# Patient Record
Sex: FEMALE | Race: WHITE | NOT HISPANIC OR LATINO | Employment: FULL TIME | URBAN - METROPOLITAN AREA
[De-identification: names, ages, dates, MRNs, and addresses within clinical notes are randomized per-mention and may not be internally consistent; named-entity substitution may affect disease eponyms.]

---

## 2023-06-19 ENCOUNTER — APPOINTMENT (OUTPATIENT)
Dept: PRIMARY CARE | Facility: CLINIC | Age: 73
End: 2023-06-19
Payer: MEDICARE

## 2023-08-21 ENCOUNTER — APPOINTMENT (OUTPATIENT)
Dept: PRIMARY CARE | Facility: CLINIC | Age: 73
End: 2023-08-21
Payer: MEDICARE

## 2023-09-18 ENCOUNTER — APPOINTMENT (OUTPATIENT)
Dept: PRIMARY CARE | Facility: CLINIC | Age: 73
End: 2023-09-18
Payer: MEDICARE

## 2023-10-05 ENCOUNTER — TELEPHONE (OUTPATIENT)
Dept: CARDIOLOGY | Facility: HOSPITAL | Age: 73
End: 2023-10-05
Payer: MEDICARE

## 2023-10-05 NOTE — TELEPHONE ENCOUNTER
Patient reports that she is still COVID+ and not taking any BP medications.     Bps:  124/86,  128/84,   121/83     Instructed to continue monitoring BP. We will determine which medications to resume at her appointment next Thursday.

## 2023-10-11 PROBLEM — K21.9 ESOPHAGEAL REFLUX: Status: ACTIVE | Noted: 2023-10-11

## 2023-10-11 PROBLEM — I42.9 CARDIOMYOPATHY (MULTI): Status: ACTIVE | Noted: 2023-10-11

## 2023-10-11 PROBLEM — E55.9 VITAMIN D DEFICIENCY: Status: ACTIVE | Noted: 2023-10-11

## 2023-10-11 PROBLEM — F41.9 ANXIETY: Status: ACTIVE | Noted: 2023-10-11

## 2023-10-11 PROBLEM — Q23.1 BICUSPID AORTIC VALVE (HHS-HCC): Status: ACTIVE | Noted: 2023-10-11

## 2023-10-11 PROBLEM — H52.13 MYOPIA, BILATERAL: Status: ACTIVE | Noted: 2023-10-11

## 2023-10-11 PROBLEM — I72.9 PSEUDOANEURYSM (CMS-HCC): Status: ACTIVE | Noted: 2023-10-11

## 2023-10-11 PROBLEM — M85.80 OSTEOPENIA: Status: ACTIVE | Noted: 2023-10-11

## 2023-10-11 PROBLEM — Z95.2 S/P TAVR (TRANSCATHETER AORTIC VALVE REPLACEMENT): Status: ACTIVE | Noted: 2023-10-11

## 2023-10-11 PROBLEM — Z85.850 HISTORY OF THYROID CANCER: Status: ACTIVE | Noted: 2023-10-11

## 2023-10-11 PROBLEM — N60.02 CYST OF LEFT BREAST: Status: ACTIVE | Noted: 2023-10-11

## 2023-10-11 PROBLEM — I71.21 ANEURYSM OF ASCENDING AORTA (CMS-HCC): Status: ACTIVE | Noted: 2023-10-11

## 2023-10-11 PROBLEM — I35.9 AORTIC VALVE DISORDER: Status: ACTIVE | Noted: 2023-10-11

## 2023-10-11 PROBLEM — R92.30 DENSE BREAST TISSUE: Status: ACTIVE | Noted: 2023-10-11

## 2023-10-11 PROBLEM — H52.4 HYPEROPIA WITH PRESBYOPIA OF BOTH EYES: Status: ACTIVE | Noted: 2023-10-11

## 2023-10-11 PROBLEM — Z78.0 MENOPAUSE: Status: ACTIVE | Noted: 2023-10-11

## 2023-10-11 PROBLEM — E78.5 HYPERLIPIDEMIA: Status: ACTIVE | Noted: 2023-10-11

## 2023-10-11 PROBLEM — I10 HYPERTENSION: Status: ACTIVE | Noted: 2023-10-11

## 2023-10-11 PROBLEM — H52.03 HYPEROPIA WITH PRESBYOPIA OF BOTH EYES: Status: ACTIVE | Noted: 2023-10-11

## 2023-10-11 PROBLEM — A49.9 BACTERIAL UTI: Status: ACTIVE | Noted: 2023-10-11

## 2023-10-11 PROBLEM — E03.9 HYPOTHYROIDISM: Status: ACTIVE | Noted: 2023-10-11

## 2023-10-11 PROBLEM — N39.0 BACTERIAL UTI: Status: ACTIVE | Noted: 2023-10-11

## 2023-10-11 PROBLEM — R92.8 ABNORMAL SCREENING MAMMOGRAM: Status: ACTIVE | Noted: 2023-10-11

## 2023-10-11 PROBLEM — H25.813 COMBINED FORM OF AGE-RELATED CATARACT, BOTH EYES: Status: ACTIVE | Noted: 2023-10-11

## 2023-10-11 PROBLEM — T14.8XXA: Status: ACTIVE | Noted: 2023-10-11

## 2023-10-11 PROBLEM — Q23.81 BICUSPID AORTIC VALVE: Status: ACTIVE | Noted: 2023-10-11

## 2023-10-11 RX ORDER — NAPROXEN SODIUM 220 MG/1
81 TABLET, FILM COATED ORAL
COMMUNITY
Start: 2021-05-12 | End: 2023-10-12 | Stop reason: ALTCHOICE

## 2023-10-11 RX ORDER — ACETAMINOPHEN 500 MG
50 TABLET ORAL DAILY
COMMUNITY

## 2023-10-11 RX ORDER — ALPRAZOLAM 0.25 MG/1
0.25 TABLET ORAL NIGHTLY PRN
COMMUNITY

## 2023-10-11 RX ORDER — LOSARTAN POTASSIUM 50 MG/1
1 TABLET ORAL 2 TIMES DAILY
COMMUNITY
Start: 2018-07-27

## 2023-10-11 RX ORDER — LEVOTHYROXINE SODIUM 88 UG/1
1 TABLET ORAL DAILY
COMMUNITY
Start: 2018-03-29

## 2023-10-11 RX ORDER — BIOTIN 5 MG
1 TABLET ORAL DAILY
COMMUNITY

## 2023-10-11 RX ORDER — AMLODIPINE BESYLATE 2.5 MG/1
1 TABLET ORAL DAILY
COMMUNITY
Start: 2021-05-12

## 2023-10-11 RX ORDER — ALENDRONATE SODIUM 70 MG/1
70 TABLET ORAL
COMMUNITY

## 2023-10-11 RX ORDER — LIOTHYRONINE SODIUM 5 UG/1
1 TABLET ORAL DAILY
COMMUNITY

## 2023-10-11 RX ORDER — VALSARTAN 160 MG/1
160 TABLET ORAL 2 TIMES DAILY
COMMUNITY
End: 2023-10-12 | Stop reason: ALTCHOICE

## 2023-10-11 RX ORDER — MULTIVITAMIN
1 TABLET ORAL DAILY
COMMUNITY
End: 2023-10-12 | Stop reason: ALTCHOICE

## 2023-10-11 RX ORDER — ATORVASTATIN CALCIUM 40 MG/1
40 TABLET, FILM COATED ORAL DAILY
COMMUNITY
Start: 2018-05-21

## 2023-10-11 RX ORDER — DIPHENHYDRAMINE HCL 25 MG
25 CAPSULE ORAL NIGHTLY
COMMUNITY

## 2023-10-11 RX ORDER — CARVEDILOL 25 MG/1
25 TABLET ORAL
COMMUNITY
Start: 2018-11-02

## 2023-10-12 ENCOUNTER — OFFICE VISIT (OUTPATIENT)
Dept: CARDIOLOGY | Facility: HOSPITAL | Age: 73
End: 2023-10-12
Payer: MEDICARE

## 2023-10-12 VITALS
BODY MASS INDEX: 19.61 KG/M2 | WEIGHT: 122 LBS | HEIGHT: 66 IN | DIASTOLIC BLOOD PRESSURE: 70 MMHG | SYSTOLIC BLOOD PRESSURE: 164 MMHG | HEART RATE: 96 BPM | OXYGEN SATURATION: 100 %

## 2023-10-12 DIAGNOSIS — I71.21 ASCENDING AORTIC ANEURYSM, UNSPECIFIED WHETHER RUPTURED (CMS-HCC): ICD-10-CM

## 2023-10-12 DIAGNOSIS — R01.1 MURMUR, HEART: Primary | ICD-10-CM

## 2023-10-12 DIAGNOSIS — E78.5 HYPERLIPIDEMIA, UNSPECIFIED HYPERLIPIDEMIA TYPE: ICD-10-CM

## 2023-10-12 DIAGNOSIS — Z95.2 S/P TAVR (TRANSCATHETER AORTIC VALVE REPLACEMENT): ICD-10-CM

## 2023-10-12 DIAGNOSIS — I10 HYPERTENSION, UNSPECIFIED TYPE: ICD-10-CM

## 2023-10-12 PROCEDURE — 1036F TOBACCO NON-USER: CPT | Performed by: NURSE PRACTITIONER

## 2023-10-12 PROCEDURE — 3077F SYST BP >= 140 MM HG: CPT | Performed by: NURSE PRACTITIONER

## 2023-10-12 PROCEDURE — 99214 OFFICE O/P EST MOD 30 MIN: CPT | Performed by: NURSE PRACTITIONER

## 2023-10-12 PROCEDURE — 3078F DIAST BP <80 MM HG: CPT | Performed by: NURSE PRACTITIONER

## 2023-10-12 PROCEDURE — 93005 ELECTROCARDIOGRAM TRACING: CPT | Performed by: NURSE PRACTITIONER

## 2023-10-12 PROCEDURE — 93010 ELECTROCARDIOGRAM REPORT: CPT | Performed by: INTERNAL MEDICINE

## 2023-10-12 RX ORDER — NAPROXEN SODIUM 220 MG/1
81 TABLET, FILM COATED ORAL DAILY
Qty: 30 TABLET | Refills: 11
Start: 2023-10-12 | End: 2024-10-11

## 2023-10-12 ASSESSMENT — PATIENT HEALTH QUESTIONNAIRE - PHQ9
SUM OF ALL RESPONSES TO PHQ9 QUESTIONS 1 AND 2: 0
2. FEELING DOWN, DEPRESSED OR HOPELESS: NOT AT ALL
1. LITTLE INTEREST OR PLEASURE IN DOING THINGS: NOT AT ALL

## 2023-10-12 ASSESSMENT — ENCOUNTER SYMPTOMS
OCCASIONAL FEELINGS OF UNSTEADINESS: 0
LOSS OF SENSATION IN FEET: 0
DEPRESSION: 0

## 2023-10-12 NOTE — PROGRESS NOTES
Primary Care Physician: Mt Zarate MD  Date of Visit: 10/12/2023 11:40 AM EDT  Location of visit: [facility]     Chief Complaint:   Chief Complaint   Patient presents with    aneurysm of ascending aorta    Cardiomyopathy    Hyperlipidemia    Hypertension        HPI / Summary:   Mj Gandhi is a 73 y.o. female presents for followup. Seen in collaboration with Dr. Reed. Patient was tested positive for covid-19 on 9/26. She started Paxlovid same day. On 9/27 she was sitting at the kitchen table having a cup a coffee. She told her  she felt very weak. She started falling over onto the floor. Dropped coffee cup onto the floor.  caught her before the fall. He reports very brief loss of consciousness 1-2 seconds. No loss of bowel or bladder. She was not eating or drinking well. Reports fever and diarrhea. She called our office after episode and was told to stop blood pressure medications. She has been monitoring her blood pressure for which she reports have been 120s over 60s. She is overall feeling better now. She has not had any orthostatic lightheadedness or syncope. She has been walking regularly without chest pain or dyspnea. Denies chest pain, dyspnea, orthopnea, pnd, lightheadedness, dizziness, palpitations, lower extremity edema, or bleeding issues.                Past Medical History:  Past Medical History:   Diagnosis Date    Asymptomatic menopausal state 06/17/2016    History of menopause        Past Surgical History:  Past Surgical History:   Procedure Laterality Date    AORTIC VALVE REPLACEMENT  05/19/2022    Aortic Valve Replacement    TOTAL THYROIDECTOMY  07/12/2018    Thyroid Surgery Total Thyroidectomy          Social History:   reports that she has never smoked. She has never used smokeless tobacco. She reports that she does not drink alcohol and does not use drugs.     Family History:  family history includes Cancer in an other family member; Diabetes in an other family  "member; Hypertension in an other family member; Hypothyroidism in her daughter.      Allergies:  Allergies   Allergen Reactions    Bee Pollen Unknown    Dog Dander Unknown    House Dust Unknown       Outpatient Medications:  Current Outpatient Medications   Medication Instructions    alendronate (FOSAMAX) 70 mg, oral, Every 7 days, Take in the morning with a full glass of water, on an empty stomach, and do not take anything else by mouth or lie down for the next 30 min.<BR>TAKE WITH 8-12 OUNCES OF WATER.     ALPRAZolam (XANAX) 0.25 mg, oral, Nightly PRN    amLODIPine (Norvasc) 2.5 mg tablet 1 tablet, oral, Daily    atorvastatin (LIPITOR) 40 mg, oral, Daily, Taking 0.5 tablet qd    biotin 5 mg tablet 1 tablet, oral, Daily    calcium carb,gluc/mag ox,gluc (CALCIUM MAGNESIUM ORAL) 1 tablet, oral, Daily    carvedilol (COREG) 25 mg, oral, 2 times daily with meals    cholecalciferol (VITAMIN D-3) 50 mcg, oral, Daily    diphenhydrAMINE (BENADRYL) 25 mg, oral, Nightly    GLUTATHIONE ORAL 1 tablet, oral, Daily    liothyronine (Cytomel) 5 mcg tablet 1 tablet, oral, Daily    losartan (Cozaar) 50 mg tablet 1 tablet, oral, 2 times daily    multivitamin with iron (HAIR VITAMINS ORAL)  HAIR VITAMIN/ZINC/COPPER ONE TABLET DAILY<BR>    NON FORMULARY  K2MK7 daily SUPPLIMENT used to DIRECT CALCIUM INTO BONE<BR>    Synthroid 88 mcg tablet 1 tablet, oral, Daily    ubidecarenone (COENZYME Q10, BULK, MISC) oral       Physical Exam:  Vitals:    10/12/23 1159   BP: (!) 200/85   BP Location: Left arm   Patient Position: Sitting   Pulse: 96   SpO2: 100%   Weight: 55.3 kg (122 lb)   Height: 1.676 m (5' 6\")     Wt Readings from Last 5 Encounters:   10/12/23 55.3 kg (122 lb)   09/30/22 55.7 kg (122 lb 12.8 oz)   05/19/22 55.8 kg (123 lb 0.6 oz)   10/29/21 54 kg (119 lb)   10/22/21 55.7 kg (122 lb 12.8 oz)     Body mass index is 19.69 kg/m².     GENERAL: alert, cooperative, pleasant, in no acute distress  SKIN: warm and dry  NECK: Normal JVD, " "negative HJR  CARDIAC: Regular rate and rhythm with 3/6 late peaking systolic murmur heard at base radiating to apex, no rub or gallops  CHEST: Normal respiratory efforts, lungs clear to auscultation bilaterally.  ABDOMEN: soft, nontender, nondistended  EXTREMITIES: no edema, +2 palpable RP and PT pulses bilaterally       Last Labs:  Recent Labs     10/11/19  1345 03/19/21  1056 09/23/22  1059   WBC 6.3 3.8* 4.1*   HGB 13.2 13.6 13.5   HCT 40.8 41.4 40.0   * 126* 132*   MCV 97 98 97     Recent Labs     10/11/19  1345 03/19/21  1056 09/23/22  1059    138 143   K 4.0 4.1 3.8    102 105   BUN 18 20 19   CREATININE 0.83 0.81 0.82     CMP -  Lab Results   Component Value Date    CALCIUM 9.5 09/23/2022    PHOS 3.7 07/04/2018    PROT 7.5 09/23/2022    ALBUMIN 4.5 09/23/2022    AST 19 09/23/2022    ALT 19 09/23/2022    ALKPHOS 48 09/23/2022    BILITOT 0.6 09/23/2022       LIPID PANEL -   Lab Results   Component Value Date    CHOL 174 09/23/2022    HDL 87.3 09/23/2022    LDLF 76 09/23/2022    TRIG 55 09/23/2022       No results found for: \"BNP\", \"HGBA1C\"    Last Cardiology Tests:  ECG:  Obtained and reviewed EKG- normal sinus rhythm HR 96, left axis deviation, LVH, possible prior septal infarct    Echo:  Echo Results:  5/12/2021  CONCLUSIONS:   1. The left ventricular systolic function is normal with a 55-60% estimated ejection fraction.   2. Abnormal septal motion consistent with post-operative status.   3. Spectral Doppler shows an impaired relaxation pattern of left ventricular diastolic filling.   4. There is a transcatheter aortic valve replacement.   5. There is moderate dilatation of the ascending aorta.         Cath:  6/15/2018  CONCLUSIONS:   1. Normal coronary arteries.              Assessment/Plan   Diagnoses and all orders for this visit:  Murmur, heart  -     Transthoracic echo (TTE) complete; Future  -     perflutren lipid microspheres (Definity) injection 3 mL of dilution  -     sulfur " hexafluoride microsphr (Lumason) injection 24.28 mg  -     perflutren protein A microsphere (Optison) injection 0.5 mL  Hypertension, unspecified type  -     ECG 12 lead (Clinic Performed)  -     CBC; Future  -     Comprehensive metabolic panel; Future  -     TSH; Future  S/P TAVR (transcatheter aortic valve replacement)  -     CT chest wo IV contrast; Future  -     aspirin 81 mg chewable tablet; Chew 1 tablet (81 mg) once daily.  Ascending aortic aneurysm, unspecified whether ruptured (CMS/HCC)  -     CT chest wo IV contrast; Future  Hyperlipidemia, unspecified hyperlipidemia type  -     Lipid panel; Future  In summary Ms. Gandhi is a pleasant 73 year-old white female with a past medical history significant for bicuspid aortic valve status post bioprosthetic AVR in 2012 with subsequent valve in valve TAVR in 2018 for severe aortic regurgitation, ascending aortic aneurysm, nonischemic cardiomyopathy with recovery of LV function, hypertension, mild coronary atherosclerosis on angiography, and hyperlipidemia. She had syncopal episode while sitting down in the setting of covid, hypotension, and suspect dehydration. She will continue to hold blood pressure medications (Amlodipine, Carvedilol, and Losartan). Her blood pressures at home are 120s over 60s. Her blood pressure is elevated today. Her home blood pressure monitor was brought to the office and reading similiarly consistent with white coat syndrome. I have ordered an echocardiogram today as her aortic valve sounds severely narrowed. I have ordered routine blood work as above. I have ordered CT of chest to be done in February for surveillance of ascending aortic aneurysm. I have instructed her to restart Aspirin 81 mg daily. She will continue all other cardiovascular medications. She will follow up in 2 months.       Orders:  No orders of the defined types were placed in this encounter.     Followup Appts:  Future Appointments   Date Time Provider Department Center    10/27/2023 11:45 AM Radha Hinton MD [id number] [facility]   12/4/2023  1:00 PM Mt Zarate MD [id number] [facility]           ____________________________________________________________  ESME Feng  [facility]

## 2023-10-12 NOTE — PATIENT INSTRUCTIONS
Stay off blood pressure medications  Re-start Aspirin 81 mg daily  Echo next week  Blood work CBC, CMP, TSH, lipid panel  Stay hydrated  CT of chest after 2/1/23 for surveillance of your aneurysm  Follow up in 2 months

## 2023-10-17 ENCOUNTER — TELEPHONE (OUTPATIENT)
Dept: ENDOCRINOLOGY | Facility: CLINIC | Age: 73
End: 2023-10-17
Payer: MEDICARE

## 2023-10-17 DIAGNOSIS — E55.9 VITAMIN D DEFICIENCY: Primary | ICD-10-CM

## 2023-10-17 DIAGNOSIS — R53.83 OTHER FATIGUE: ICD-10-CM

## 2023-10-17 DIAGNOSIS — E03.9 HYPOTHYROIDISM, UNSPECIFIED TYPE: ICD-10-CM

## 2023-10-17 NOTE — TELEPHONE ENCOUNTER
Pt would like to have her vitamin b 12 level checked when she has her thyroid levels checked and is asking to have you place an order.

## 2023-10-19 ENCOUNTER — TELEPHONE (OUTPATIENT)
Dept: CARDIOLOGY | Facility: HOSPITAL | Age: 73
End: 2023-10-19
Payer: MEDICARE

## 2023-10-19 NOTE — TELEPHONE ENCOUNTER
----- Message from Swetha Dominguez RN sent at 10/18/2023 12:57 PM EDT -----  Regarding: Blood Pressure Readings  Patient saw Rosalva last week. She's concerned about her blood pressure readings and heart rate. She's wondering if she should start some meds?    It also says on her summary from last week to stop her multivitamin.    Today 10/18/23:    143/69, 83  143/71, 86    Yesterday 10/17/23:    135/70  124/67      This morning 120/70 blood pressure. Continue to monitor blood pressure. No changes.

## 2023-10-20 ENCOUNTER — APPOINTMENT (OUTPATIENT)
Dept: CARDIOLOGY | Facility: HOSPITAL | Age: 73
End: 2023-10-20
Payer: MEDICARE

## 2023-10-27 ENCOUNTER — APPOINTMENT (OUTPATIENT)
Dept: OBSTETRICS AND GYNECOLOGY | Facility: CLINIC | Age: 73
End: 2023-10-27
Payer: MEDICARE

## 2023-12-01 ENCOUNTER — HOSPITAL ENCOUNTER (OUTPATIENT)
Dept: CARDIOLOGY | Facility: HOSPITAL | Age: 73
Discharge: HOME | End: 2023-12-01
Payer: MEDICARE

## 2023-12-01 ENCOUNTER — LAB (OUTPATIENT)
Dept: LAB | Facility: LAB | Age: 73
End: 2023-12-01
Payer: MEDICARE

## 2023-12-01 DIAGNOSIS — E78.5 HYPERLIPIDEMIA, UNSPECIFIED HYPERLIPIDEMIA TYPE: ICD-10-CM

## 2023-12-01 DIAGNOSIS — I10 HYPERTENSION, UNSPECIFIED TYPE: ICD-10-CM

## 2023-12-01 DIAGNOSIS — E55.9 VITAMIN D DEFICIENCY: ICD-10-CM

## 2023-12-01 DIAGNOSIS — E03.9 HYPOTHYROIDISM, UNSPECIFIED TYPE: ICD-10-CM

## 2023-12-01 DIAGNOSIS — R01.1 MURMUR, HEART: ICD-10-CM

## 2023-12-01 DIAGNOSIS — R53.83 OTHER FATIGUE: ICD-10-CM

## 2023-12-01 LAB
25(OH)D3 SERPL-MCNC: 65 NG/ML (ref 30–100)
ALBUMIN SERPL BCP-MCNC: 4.1 G/DL (ref 3.4–5)
ALP SERPL-CCNC: 51 U/L (ref 33–136)
ALT SERPL W P-5'-P-CCNC: 21 U/L (ref 7–45)
ANION GAP SERPL CALC-SCNC: 13 MMOL/L (ref 10–20)
AORTIC VALVE MEAN GRADIENT: 16
AORTIC VALVE PEAK VELOCITY: 2.81
AST SERPL W P-5'-P-CCNC: 23 U/L (ref 9–39)
AV PEAK GRADIENT: 31.6
AVA (PEAK VEL): 1.55
AVA (VTI): 1.47
BILIRUB SERPL-MCNC: 0.6 MG/DL (ref 0–1.2)
BUN SERPL-MCNC: 25 MG/DL (ref 6–23)
CALCIUM SERPL-MCNC: 8.5 MG/DL (ref 8.6–10.3)
CHLORIDE SERPL-SCNC: 105 MMOL/L (ref 98–107)
CHOLEST SERPL-MCNC: 156 MG/DL (ref 0–199)
CHOLESTEROL/HDL RATIO: 1.9
CO2 SERPL-SCNC: 26 MMOL/L (ref 21–32)
CREAT SERPL-MCNC: 0.69 MG/DL (ref 0.5–1.05)
EJECTION FRACTION APICAL 4 CHAMBER: 66.3
EJECTION FRACTION: 65
ERYTHROCYTE [DISTWIDTH] IN BLOOD BY AUTOMATED COUNT: 13.8 % (ref 11.5–14.5)
GFR SERPL CREATININE-BSD FRML MDRD: >90 ML/MIN/1.73M*2
GLUCOSE SERPL-MCNC: 81 MG/DL (ref 74–99)
HCT VFR BLD AUTO: 38.8 % (ref 36–46)
HDLC SERPL-MCNC: 82.1 MG/DL
HGB BLD-MCNC: 12.5 G/DL (ref 12–16)
LDLC SERPL CALC-MCNC: 64 MG/DL
LEFT ATRIUM VOLUME AREA LENGTH INDEX BSA: 43
LEFT VENTRICLE INTERNAL DIMENSION DIASTOLE: 5.2 (ref 3.5–6)
LEFT VENTRICULAR OUTFLOW TRACT DIAMETER: 2.1
MCH RBC QN AUTO: 31.1 PG (ref 26–34)
MCHC RBC AUTO-ENTMCNC: 32.2 G/DL (ref 32–36)
MCV RBC AUTO: 97 FL (ref 80–100)
MITRAL VALVE E/A RATIO: 0.77
MITRAL VALVE E/E' RATIO: 18.63
NON HDL CHOLESTEROL: 74 MG/DL (ref 0–149)
NRBC BLD-RTO: 0 /100 WBCS (ref 0–0)
PLATELET # BLD AUTO: 118 X10*3/UL (ref 150–450)
POTASSIUM SERPL-SCNC: 3.7 MMOL/L (ref 3.5–5.3)
PROT SERPL-MCNC: 6.9 G/DL (ref 6.4–8.2)
RBC # BLD AUTO: 4.02 X10*6/UL (ref 4–5.2)
RIGHT VENTRICLE PEAK SYSTOLIC PRESSURE: 36.6
SODIUM SERPL-SCNC: 140 MMOL/L (ref 136–145)
T3FREE SERPL-MCNC: 2.9 PG/ML (ref 2.3–4.2)
T4 FREE SERPL-MCNC: 1.06 NG/DL (ref 0.61–1.12)
TRIGL SERPL-MCNC: 48 MG/DL (ref 0–149)
TSH SERPL-ACNC: 0.75 MIU/L (ref 0.44–3.98)
VIT B12 SERPL-MCNC: 681 PG/ML (ref 211–911)
VLDL: 10 MG/DL (ref 0–40)
WBC # BLD AUTO: 6.1 X10*3/UL (ref 4.4–11.3)

## 2023-12-01 PROCEDURE — 93306 TTE W/DOPPLER COMPLETE: CPT | Performed by: INTERNAL MEDICINE

## 2023-12-01 PROCEDURE — 84439 ASSAY OF FREE THYROXINE: CPT

## 2023-12-01 PROCEDURE — 82306 VITAMIN D 25 HYDROXY: CPT

## 2023-12-01 PROCEDURE — 36415 COLL VENOUS BLD VENIPUNCTURE: CPT

## 2023-12-01 PROCEDURE — 84481 FREE ASSAY (FT-3): CPT

## 2023-12-01 PROCEDURE — 80061 LIPID PANEL: CPT

## 2023-12-01 PROCEDURE — 82607 VITAMIN B-12: CPT

## 2023-12-01 PROCEDURE — 93306 TTE W/DOPPLER COMPLETE: CPT

## 2023-12-01 PROCEDURE — 85027 COMPLETE CBC AUTOMATED: CPT

## 2023-12-01 PROCEDURE — 80053 COMPREHEN METABOLIC PANEL: CPT

## 2023-12-01 PROCEDURE — 84443 ASSAY THYROID STIM HORMONE: CPT

## 2023-12-03 LAB
ATRIAL RATE: 96 BPM
P AXIS: 73 DEGREES
P OFFSET: 182 MS
P ONSET: 123 MS
PR INTERVAL: 164 MS
Q ONSET: 205 MS
QRS COUNT: 15 BEATS
QRS DURATION: 128 MS
QT INTERVAL: 394 MS
QTC CALCULATION(BAZETT): 497 MS
QTC FREDERICIA: 460 MS
R AXIS: -68 DEGREES
T AXIS: 96 DEGREES
T OFFSET: 402 MS
VENTRICULAR RATE: 96 BPM

## 2023-12-04 ENCOUNTER — APPOINTMENT (OUTPATIENT)
Dept: PRIMARY CARE | Facility: CLINIC | Age: 73
End: 2023-12-04
Payer: MEDICARE

## 2023-12-06 DIAGNOSIS — I35.1 SEVERE AORTIC VALVE REGURGITATION: Primary | ICD-10-CM

## 2023-12-07 ENCOUNTER — APPOINTMENT (OUTPATIENT)
Dept: CARDIOLOGY | Facility: HOSPITAL | Age: 73
End: 2023-12-07
Payer: MEDICARE

## 2023-12-08 ENCOUNTER — TELEPHONE (OUTPATIENT)
Dept: CARDIOLOGY | Facility: HOSPITAL | Age: 73
End: 2023-12-08
Payer: MEDICARE

## 2023-12-12 ENCOUNTER — TELEPHONE (OUTPATIENT)
Dept: CARDIOLOGY | Facility: HOSPITAL | Age: 73
End: 2023-12-12
Payer: MEDICARE

## 2023-12-18 ENCOUNTER — OFFICE VISIT (OUTPATIENT)
Dept: CARDIOLOGY | Facility: HOSPITAL | Age: 73
End: 2023-12-18
Payer: MEDICARE

## 2023-12-18 ENCOUNTER — APPOINTMENT (OUTPATIENT)
Dept: CARDIOLOGY | Facility: HOSPITAL | Age: 73
End: 2023-12-18
Payer: MEDICARE

## 2023-12-18 ENCOUNTER — OFFICE VISIT (OUTPATIENT)
Dept: CARDIAC SURGERY | Facility: HOSPITAL | Age: 73
End: 2023-12-18
Payer: MEDICARE

## 2023-12-18 VITALS
SYSTOLIC BLOOD PRESSURE: 174 MMHG | HEART RATE: 99 BPM | HEIGHT: 66 IN | BODY MASS INDEX: 19.44 KG/M2 | WEIGHT: 121 LBS | DIASTOLIC BLOOD PRESSURE: 80 MMHG | OXYGEN SATURATION: 96 %

## 2023-12-18 DIAGNOSIS — I35.1 SEVERE AORTIC VALVE REGURGITATION: ICD-10-CM

## 2023-12-18 DIAGNOSIS — I35.1 NONRHEUMATIC AORTIC VALVE INSUFFICIENCY: ICD-10-CM

## 2023-12-18 DIAGNOSIS — Z95.2 S/P TAVR (TRANSCATHETER AORTIC VALVE REPLACEMENT): ICD-10-CM

## 2023-12-18 PROCEDURE — 3079F DIAST BP 80-89 MM HG: CPT | Performed by: INTERNAL MEDICINE

## 2023-12-18 PROCEDURE — 99205 OFFICE O/P NEW HI 60 MIN: CPT | Performed by: THORACIC SURGERY (CARDIOTHORACIC VASCULAR SURGERY)

## 2023-12-18 PROCEDURE — 99214 OFFICE O/P EST MOD 30 MIN: CPT | Performed by: INTERNAL MEDICINE

## 2023-12-18 PROCEDURE — 1036F TOBACCO NON-USER: CPT | Performed by: INTERNAL MEDICINE

## 2023-12-18 PROCEDURE — 3077F SYST BP >= 140 MM HG: CPT | Performed by: INTERNAL MEDICINE

## 2023-12-18 PROCEDURE — 99215 OFFICE O/P EST HI 40 MIN: CPT | Performed by: THORACIC SURGERY (CARDIOTHORACIC VASCULAR SURGERY)

## 2023-12-18 PROCEDURE — 1036F TOBACCO NON-USER: CPT | Performed by: THORACIC SURGERY (CARDIOTHORACIC VASCULAR SURGERY)

## 2023-12-18 PROCEDURE — 1159F MED LIST DOCD IN RCRD: CPT | Performed by: THORACIC SURGERY (CARDIOTHORACIC VASCULAR SURGERY)

## 2023-12-18 PROCEDURE — 1159F MED LIST DOCD IN RCRD: CPT | Performed by: INTERNAL MEDICINE

## 2023-12-18 PROCEDURE — 1125F AMNT PAIN NOTED PAIN PRSNT: CPT | Performed by: THORACIC SURGERY (CARDIOTHORACIC VASCULAR SURGERY)

## 2023-12-18 PROCEDURE — 1125F AMNT PAIN NOTED PAIN PRSNT: CPT | Performed by: INTERNAL MEDICINE

## 2023-12-18 RX ORDER — OMEGA-3-ACID ETHYL ESTERS 1 G/1
1 CAPSULE, LIQUID FILLED ORAL 2 TIMES DAILY
COMMUNITY

## 2023-12-18 ASSESSMENT — PAIN SCALES - GENERAL: PAINLEVEL: 3

## 2023-12-18 NOTE — PROGRESS NOTES
Cardio: Derek       HPI: 73 year-old white female with a past medical history significant for bicuspid aortic valve status post bioprosthetic AVR in 2012 with subsequent valve in valve 34 Eolut Fx TAVR in 2018 for severe aortic regurgitation, ascending aortic aneurysm, nonischemic cardiomyopathy with recovery of LV function, hypertension, mild coronary atherosclerosis on angiography, and hyperlipidemia who is referred to us for Aortic regurgitation.   She had syncopal episode while sitting down in the setting of covid, denies any falls since then. Her blood pressures at home have been 120s over 60s.     On my interview today patient denies symptoms states she can do 20 minutes on the treadmill.  She does her shopping and grocery with no issues.  Also does weights sometimes in the gym with no issues.        ROS:    Constitutional: fatigue  Eyes: no eyesight problems, no blurred vision, no diplopia, no eye pain, no purulent discharge from the eyes, eyes not red, no dryness of the eyes and no itching of the eyes.   ENT: no nosebleeds, no hearing loss, no tinnitus, no earache, no sore throat, no hoarseness, no swollen glands in the neck and no nasal discharge.   Cardiovascular: no dyspnea on exertion, no chest pain  Respiratory: no chronic cough, not coughing up sputum, no wheezing that is consistent with asthma  Gastrointestinal: no change in bowel habits, no blood in stools, no diarrhea, no constipation, no nausea, no vomiting, no abdominal pain, no signs and symptoms of ulcer disease, no lexi colored stools and no intolerance to fatty foods.   Genitourinary: no hematuria,  no urinary frequency, no dysuria, no incontinence, no burning sensation during urination, no urinary hesitancy, no nocturia, no genital lesion, no testicular pain, urinary stream is not smaller and urinary stream does not start and stop.   Musculoskeletal: no arthralgias, no myalgias, no joint swelling, no joint stiffness, no muscle weakness, no  back pain, no limb pain, no limb swelling and no difficulty walking.   Skin: no skin rashes, no change in skin color and pigmentation, no skin lesions and no skin lumps.   Neurological: no seizures, no frequent falls, no headaches, no dizziness, no tingling, no fainting and no limb weakness.   Psychiatric: no depression, not suicidal, no confusion, no memory lapses or loss, no anxiety, no personality change and no emotional problems.   Endocrine: no goiter, no thyroid disorder, no diabetes mellitus, no excessive thirst, no dry skin, no cold intolerance, no heat intolerance, no erectile dysfunction, no increased urinary frequency, no proptosis and no deepening of the voice.   Hematologic/Lymphatic: no bleeding issues.   All other systems have been reviewed and are negative for complaint.       TAVR Workup:   - NYHA: I-II  - Frailty: 0/5  - EKG: Normal sinus rhythm,  ms  - TTE: 12/1/2023-EF 55%, severe aortic valve regurgitation mean gradient across aortic valve of 16 mmHg, mildly dilated Aortic root to 4.43  - CT TAVR: None  - LHC: Pending  - dental clearance: Goes regularly to dentist, no issues    - STS  Procedure Type: Isolated AVR  Perioperative Outcome Estimate %  Operative Mortality 1.6%  Morbidity & Mortality 8.11%  Stroke 1.9%  Renal Failure 0.469%  Reoperation 6.95%  Prolonged Ventilation 3.07%  Deep Sternal Wound Infection 0.048%  Long Hospital Stay (>14 days) 3.29%  Short Hospital Stay (<6 days)* 46.9%    Physical Exam:  Constitutional: alert and in no acute distress.   Eyes: no erythema, swelling or discharge from the eye .   ENT: no erythema, edema, exudate or lesions .   Neck: neck is supple, no JVD  Pulmonary: no increased work of breathing or signs of respiratory distress , lungs clear to auscultation. , normal percussion of chest  and chest palpation normal .   Cardiovascular: RRR, 3/6 diastolic murmur  no pitting edema  Abdomen: abdomen non-tender, no masses  and no hepatomegaly .  "  Neurologic: non-focal neurologic examination.           10/2/2020     2:38 PM 5/12/2021    11:18 AM 10/22/2021    12:00 PM 10/29/2021    12:40 PM 5/19/2022    10:31 AM 9/30/2022    12:01 PM 10/12/2023    11:59 AM   Vitals   Systolic 130 174 164 140 164 152 164   Diastolic 80 98 98 90 90 90 70   Heart Rate  62 86  69 80 96   Temp   36.4 °C (97.6 °F)   36.1 °C (97 °F)    Resp   16   16    Height (in)  1.676 m (5' 6\") 1.676 m (5' 6\") 1.676 m (5' 6\") 1.676 m (5' 6\") 1.676 m (5' 6\") 1.676 m (5' 6\")   Weight (lb) 123 125 122.8 119 123.04 122.8 122   BMI 19.85 kg/m2 20.18 kg/m2 19.82 kg/m2 19.21 kg/m2 19.86 kg/m2 19.82 kg/m2 19.69 kg/m2   BSA (m2) 1.61 m2 1.62 m2 1.61 m2 1.59 m2 1.61 m2 1.61 m2 1.6 m2   Visit Report       Report        Current Outpatient Medications   Medication Instructions    alendronate (FOSAMAX) 70 mg, oral, Every 7 days, Take in the morning with a full glass of water, on an empty stomach, and do not take anything else by mouth or lie down for the next 30 min.<BR>TAKE WITH 8-12 OUNCES OF WATER.     ALPRAZolam (XANAX) 0.25 mg, oral, Nightly PRN    amLODIPine (Norvasc) 2.5 mg tablet 1 tablet, oral, Daily    aspirin 81 mg, oral, Daily    atorvastatin (LIPITOR) 40 mg, oral, Daily, Taking 0.5 tablet qd    biotin 5 mg tablet 1 tablet, oral, Daily    calcium carb,gluc/mag ox,gluc (CALCIUM MAGNESIUM ORAL) 1 tablet, oral, Daily    carvedilol (COREG) 25 mg, oral, 2 times daily with meals    cholecalciferol (VITAMIN D-3) 50 mcg, oral, Daily    diphenhydrAMINE (BENADRYL) 25 mg, oral, Nightly    GLUTATHIONE ORAL 1 tablet, oral, Daily    liothyronine (Cytomel) 5 mcg tablet 1 tablet, oral, Daily    losartan (Cozaar) 50 mg tablet 1 tablet, oral, 2 times daily    multivitamin with iron (HAIR VITAMINS ORAL)  HAIR VITAMIN/ZINC/COPPER ONE TABLET DAILY<BR>    NON FORMULARY  K2MK7 daily SUPPLIMENT used to DIRECT CALCIUM INTO BONE<BR>    Synthroid 88 mcg tablet 1 tablet, oral, Daily    ubidecarenone (COENZYME Q10, BULK, " MISC) oral        Impression:     73 year-old white female with a past medical history significant for bicuspid aortic valve status post bioprosthetic AVR in 2012 with subsequent valve in valve 34 Eolut Fx TAVR in 2018 for severe aortic regurgitation, ascending aortic aneurysm, nonischemic cardiomyopathy with recovery of LV function, hypertension, mild coronary atherosclerosis on angiography, and hyperlipidemia who is referred to us for severe Aortic regurgitation.     Patient has severe aortic regurgitation on echocardiogram but is only mildly symptomatic.     -We will do a transesophageal echocardiogram to better evaluate the aortic regurgitation and the mechanism.  -We will also get a CT TAVR  -Patient will also need a left heart cath    Decision will be made regarding surgical versus percutaneous options.      We discussed all the risks associated with the procedure, including but not limited to stroke, MI, pericardial tamponade, vascular complications, infection and death were discussed with the patient. The risk of needing a permament pacemaker was also discussed in detail. The patient verbalized understanding and decided to proceed with the procedure.     We will discuss this patient's case at our Valve Team meeting with representatives from [facility] Card[facility]. Our nurse navigators will contact patient with further diagnostic needs and formal plan.

## 2023-12-18 NOTE — PROGRESS NOTES
[facility] Questionnaire      1  Heart failure affects different people in different ways. Some feel shortness of breath while others feel fatigue. Please indicate how much you are limited by heart failure (shortness of breath or fatigue) in your ability to do the following activities over the past 2 weeks. PRE PROCEDURE    A.) Showering/bathing  6. Limited for other reastons  B.) Walking 1 block on level ground 6. Limited for other reastons  C.) Hurrying or Jogging   6. Limited for other reastons    2.  Over the past 2 weeks, how many times did you have swelling in your feet, ankles or legs when you woke up in the morning? 5. Never    3.  Over the past 2 weeks, on average, how many times has fatigue limited your ability to do what you wanted? 7. Never    4.  Over the past 2 weeks, on average, how many times has shortness of breath limited your ability to do what you wanted? 7. Never    5.  Over the past 2 weeks, on average, how many times have you been forced to sleep sitting up in a chair or with at least 3 pillows to prop you up because of shortness of breath? Never    6. Over the past 2 weeks, how much has your heart failure limited your enjoyment of life? It has not limited my enjoyment of life    7. If you had to spend the rest of your life with your heart failure the way it is right now, how would you feel about this? 5. Completely satisfied    8. How much does your heart failure affect your lifestyle? Please indicate how your heart failure may have limited yourparticipation in the following activities over the past 2 weeks    A.)  Hobbies, recreational activities  6. Does not apply for other reasons    B.) Working or doing household chores  6. Does not apply for other reasons    C.) Visiting family or friends out of your home  6. Does not apply for other reasons    5 Meter Walk 9.00seconds

## 2023-12-19 NOTE — PROGRESS NOTES
Cardio: Derek     HPI: 73 year-old white female with a past medical history significant for bicuspid aortic valve status post bioprosthetic AVR in 2012 with subsequent valve in valve 34 Eolut Fx TAVR in 2018 for severe aortic regurgitation, ascending aortic aneurysm, nonischemic cardiomyopathy with recovery of LV function, hypertension, mild coronary atherosclerosis on angiography, and hyperlipidemia who is referred to us for Aortic regurgitation.   She had syncopal episode while sitting down in the setting of covid, denies any falls since then. Her blood pressures at home have been 120s over 60s.     On my interview today patient denies symptoms states she can do 20 minutes on the treadmill.  She does her shopping and grocery with no issues.  Also does weights sometimes in the gym with no issues.        ROS:    Constitutional: fatigue  Eyes: no eyesight problems, no blurred vision, no diplopia, no eye pain, no purulent discharge from the eyes, eyes not red, no dryness of the eyes and no itching of the eyes.   ENT: no nosebleeds, no hearing loss, no tinnitus, no earache, no sore throat, no hoarseness, no swollen glands in the neck and no nasal discharge.   Cardiovascular: no dyspnea on exertion, no chest pain  Respiratory: no chronic cough, not coughing up sputum, no wheezing that is consistent with asthma  Gastrointestinal: no change in bowel habits, no blood in stools, no diarrhea, no constipation, no nausea, no vomiting, no abdominal pain, no signs and symptoms of ulcer disease, no lexi colored stools and no intolerance to fatty foods.   Genitourinary: no hematuria,  no urinary frequency, no dysuria, no incontinence, no burning sensation during urination, no urinary hesitancy, no nocturia, no genital lesion, no testicular pain, urinary stream is not smaller and urinary stream does not start and stop.   Musculoskeletal: no arthralgias, no myalgias, no joint swelling, no joint stiffness, no muscle weakness, no back  pain, no limb pain, no limb swelling and no difficulty walking.   Skin: no skin rashes, no change in skin color and pigmentation, no skin lesions and no skin lumps.   Neurological: no seizures, no frequent falls, no headaches, no dizziness, no tingling, no fainting and no limb weakness.   Psychiatric: no depression, not suicidal, no confusion, no memory lapses or loss, no anxiety, no personality change and no emotional problems.   Endocrine: no goiter, no thyroid disorder, no diabetes mellitus, no excessive thirst, no dry skin, no cold intolerance, no heat intolerance, no erectile dysfunction, no increased urinary frequency, no proptosis and no deepening of the voice.   Hematologic/Lymphatic: no bleeding issues.   All other systems have been reviewed and are negative for complaint.       TAVR Workup:   - NYHA: I-II  - Frailty: 0/5  - EKG: Normal sinus rhythm,  ms  - TTE: 12/1/2023-EF 55%, severe aortic valve regurgitation mean gradient across aortic valve of 16 mmHg, mildly dilated Aortic root to 4.43 cm  - CT TAVR: None  - LHC: Pending  - dental clearance: Goes regularly to dentist, no issues    - STS  Procedure Type: Isolated AVR  Perioperative Outcome Estimate %  Operative Mortality 1.6%  Morbidity & Mortality 8.11%  Stroke 1.9%  Renal Failure 0.469%  Reoperation 6.95%  Prolonged Ventilation 3.07%  Deep Sternal Wound Infection 0.048%  Long Hospital Stay (>14 days) 3.29%  Short Hospital Stay (<6 days)* 46.9%    Physical Exam:  Constitutional: alert and in no acute distress.   Eyes: no erythema, swelling or discharge from the eye .   ENT: no erythema, edema, exudate or lesions .   Neck: neck is supple, no JVD  Pulmonary: no increased work of breathing or signs of respiratory distress , lungs clear to auscultation. , normal percussion of chest  and chest palpation normal .   Cardiovascular: RRR, 3/6 diastolic murmur  no pitting edema  Abdomen: abdomen non-tender, no masses  and no hepatomegaly .   Neurologic:  "non-focal neurologic examination.           10/2/2020     2:38 PM 5/12/2021    11:18 AM 10/22/2021    12:00 PM 10/29/2021    12:40 PM 5/19/2022    10:31 AM 9/30/2022    12:01 PM 10/12/2023    11:59 AM   Vitals   Systolic 130 174 164 140 164 152 164   Diastolic 80 98 98 90 90 90 70   Heart Rate  62 86  69 80 96   Temp   36.4 °C (97.6 °F)   36.1 °C (97 °F)    Resp   16   16    Height (in)  1.676 m (5' 6\") 1.676 m (5' 6\") 1.676 m (5' 6\") 1.676 m (5' 6\") 1.676 m (5' 6\") 1.676 m (5' 6\")   Weight (lb) 123 125 122.8 119 123.04 122.8 122   BMI 19.85 kg/m2 20.18 kg/m2 19.82 kg/m2 19.21 kg/m2 19.86 kg/m2 19.82 kg/m2 19.69 kg/m2   BSA (m2) 1.61 m2 1.62 m2 1.61 m2 1.59 m2 1.61 m2 1.61 m2 1.6 m2   Visit Report       Report        Current Outpatient Medications   Medication Instructions    alendronate (FOSAMAX) 70 mg, oral, Every 7 days, Take in the morning with a full glass of water, on an empty stomach, and do not take anything else by mouth or lie down for the next 30 min.<BR>TAKE WITH 8-12 OUNCES OF WATER.     ALPRAZolam (XANAX) 0.25 mg, oral, Nightly PRN    amLODIPine (Norvasc) 2.5 mg tablet 1 tablet, oral, Daily    aspirin 81 mg, oral, Daily    atorvastatin (LIPITOR) 40 mg, oral, Daily, Taking 0.5 tablet qd    biotin 5 mg tablet 1 tablet, oral, Daily    calcium carb,gluc/mag ox,gluc (CALCIUM MAGNESIUM ORAL) 1 tablet, oral, Daily    carvedilol (COREG) 25 mg, oral, 2 times daily with meals    cholecalciferol (VITAMIN D-3) 50 mcg, oral, Daily    diphenhydrAMINE (BENADRYL) 25 mg, oral, Nightly    GLUTATHIONE ORAL 1 tablet, oral, Daily    liothyronine (Cytomel) 5 mcg tablet 1 tablet, oral, Daily    losartan (Cozaar) 50 mg tablet 1 tablet, oral, 2 times daily    multivitamin with iron (HAIR VITAMINS ORAL)  HAIR VITAMIN/ZINC/COPPER ONE TABLET DAILY<BR>    NON FORMULARY  K2MK7 daily SUPPLIMENT used to DIRECT CALCIUM INTO BONE<BR>    Synthroid 88 mcg tablet 1 tablet, oral, Daily    ubidecarenone (COENZYME Q10, BULK, MISC) oral    "     Impression:     73 year-old white female with a past medical history significant for bicuspid aortic valve status post bioprosthetic AVR in 2012 with subsequent valve in valve 34 Eolut Fx TAVR in 2018 for severe aortic regurgitation, ascending aortic aneurysm, nonischemic cardiomyopathy with recovery of LV function, hypertension, mild coronary atherosclerosis on angiography, and hyperlipidemia who is referred to us for severe Aortic regurgitation.     Patient has severe aortic regurgitation on echocardiogram but is only mildly symptomatic.     -We will do a transesophageal echocardiogram to better evaluate the aortic regurgitation and the mechanism.  -We will also get a CT TAVR  -Patient will also need a left heart cath    Decision will be made regarding surgical versus percutaneous options.      We discussed all the risks associated with the procedure, including but not limited to stroke, MI, pericardial tamponade, vascular complications, infection and death were discussed with the patient. The risk of needing a permament pacemaker was also discussed in detail. The patient verbalized understanding and decided to proceed with the procedure.     We will discuss this patient's case at our Valve Team meeting with representatives from [facility]. Our nurse navigators will contact patient with further diagnostic needs and formal plan.

## 2023-12-20 DIAGNOSIS — I35.1 NONRHEUMATIC AORTIC VALVE INSUFFICIENCY: Primary | ICD-10-CM

## 2023-12-21 DIAGNOSIS — I35.1 NONRHEUMATIC AORTIC VALVE INSUFFICIENCY: Primary | ICD-10-CM

## 2023-12-27 ENCOUNTER — HOSPITAL ENCOUNTER (OUTPATIENT)
Facility: HOSPITAL | Age: 73
Setting detail: OUTPATIENT SURGERY
End: 2023-12-27
Attending: INTERNAL MEDICINE | Admitting: INTERNAL MEDICINE
Payer: MEDICARE

## 2023-12-27 PROBLEM — I35.1 NONRHEUMATIC AORTIC VALVE INSUFFICIENCY: Status: ACTIVE | Noted: 2023-12-21

## 2023-12-29 ENCOUNTER — HOSPITAL ENCOUNTER (OUTPATIENT)
Dept: RADIOLOGY | Facility: HOSPITAL | Age: 73
Discharge: HOME | End: 2023-12-29
Payer: MEDICARE

## 2023-12-29 DIAGNOSIS — I35.1 NONRHEUMATIC AORTIC VALVE INSUFFICIENCY: ICD-10-CM

## 2023-12-29 PROCEDURE — 2550000001 HC RX 255 CONTRASTS: Performed by: INTERNAL MEDICINE

## 2023-12-29 PROCEDURE — 74174 CTA ABD&PLVS W/CONTRAST: CPT | Performed by: RADIOLOGY

## 2023-12-29 PROCEDURE — 71275 CT ANGIOGRAPHY CHEST: CPT | Performed by: RADIOLOGY

## 2023-12-29 PROCEDURE — 74174 CTA ABD&PLVS W/CONTRAST: CPT

## 2023-12-29 RX ADMIN — IOHEXOL 80 ML: 350 INJECTION, SOLUTION INTRAVENOUS at 10:19

## 2024-01-19 ENCOUNTER — APPOINTMENT (OUTPATIENT)
Dept: OBSTETRICS AND GYNECOLOGY | Facility: CLINIC | Age: 74
End: 2024-01-19
Payer: MEDICARE

## 2024-01-24 ENCOUNTER — APPOINTMENT (OUTPATIENT)
Dept: PRIMARY CARE | Facility: CLINIC | Age: 74
End: 2024-01-24
Payer: MEDICARE

## 2024-01-25 ENCOUNTER — TELEPHONE (OUTPATIENT)
Dept: CARDIOLOGY | Facility: HOSPITAL | Age: 74
End: 2024-01-25
Payer: MEDICARE

## 2024-01-25 NOTE — TELEPHONE ENCOUNTER
Patient called to give an update, will have a second opinion for the TAVR at [facility] on 2/6.     She has not been on any blood pressure medication since she was here last and her BP has remained normal, 120/80 readings, and she feels good.     She just wanted to make sure that is okay. She did request to maybe speak to you if you have a minute.

## 2024-01-26 ENCOUNTER — TELEPHONE (OUTPATIENT)
Dept: CARDIOLOGY | Facility: HOSPITAL | Age: 74
End: 2024-01-26
Payer: MEDICARE

## 2024-01-31 ENCOUNTER — APPOINTMENT (OUTPATIENT)
Dept: PRIMARY CARE | Facility: CLINIC | Age: 74
End: 2024-01-31
Payer: MEDICARE

## 2024-03-18 DIAGNOSIS — Z00.00 HEALTHCARE MAINTENANCE: ICD-10-CM

## 2024-03-18 RX ORDER — VALSARTAN 40 MG/1
40 TABLET ORAL DAILY
Qty: 90 TABLET | Refills: 3 | OUTPATIENT
Start: 2024-03-18 | End: 2025-03-18

## 2024-03-21 ENCOUNTER — APPOINTMENT (OUTPATIENT)
Dept: CARDIOLOGY | Facility: HOSPITAL | Age: 74
End: 2024-03-21
Payer: MEDICARE

## 2024-04-12 ENCOUNTER — APPOINTMENT (OUTPATIENT)
Dept: OBSTETRICS AND GYNECOLOGY | Facility: CLINIC | Age: 74
End: 2024-04-12
Payer: MEDICARE

## 2024-05-22 ENCOUNTER — APPOINTMENT (OUTPATIENT)
Dept: PRIMARY CARE | Facility: CLINIC | Age: 74
End: 2024-05-22
Payer: MEDICARE

## 2024-06-26 ENCOUNTER — APPOINTMENT (OUTPATIENT)
Dept: CARDIOLOGY | Facility: HOSPITAL | Age: 74
End: 2024-06-26
Payer: MEDICARE

## 2024-07-15 RX ORDER — VALSARTAN 40 MG/1
40 TABLET ORAL DAILY
Qty: 100 TABLET | Refills: 3 | OUTPATIENT
Start: 2024-07-15 | End: 2025-08-19

## 2024-08-16 ENCOUNTER — APPOINTMENT (OUTPATIENT)
Dept: OBSTETRICS AND GYNECOLOGY | Facility: CLINIC | Age: 74
End: 2024-08-16
Payer: MEDICARE

## 2024-10-03 DIAGNOSIS — E03.9 HYPOTHYROIDISM, UNSPECIFIED TYPE: Primary | ICD-10-CM

## 2024-10-03 RX ORDER — LEVOTHYROXINE SODIUM 88 UG/1
88 TABLET ORAL DAILY
Qty: 90 TABLET | Refills: 0 | Status: SHIPPED | OUTPATIENT
Start: 2024-10-03 | End: 2025-10-03

## 2024-10-07 ENCOUNTER — APPOINTMENT (OUTPATIENT)
Dept: ENDOCRINOLOGY | Facility: CLINIC | Age: 74
End: 2024-10-07
Payer: MEDICARE

## 2024-10-07 VITALS
RESPIRATION RATE: 16 BRPM | WEIGHT: 121.6 LBS | SYSTOLIC BLOOD PRESSURE: 130 MMHG | BODY MASS INDEX: 19.54 KG/M2 | DIASTOLIC BLOOD PRESSURE: 72 MMHG | HEIGHT: 66 IN | HEART RATE: 72 BPM

## 2024-10-07 DIAGNOSIS — E03.9 HYPOTHYROIDISM, UNSPECIFIED TYPE: Primary | ICD-10-CM

## 2024-10-07 PROBLEM — N39.0 BACTERIAL UTI: Status: RESOLVED | Noted: 2023-10-11 | Resolved: 2024-10-07

## 2024-10-07 PROBLEM — A49.9 BACTERIAL UTI: Status: RESOLVED | Noted: 2023-10-11 | Resolved: 2024-10-07

## 2024-10-07 PROBLEM — R92.8 ABNORMAL SCREENING MAMMOGRAM: Status: RESOLVED | Noted: 2023-10-11 | Resolved: 2024-10-07

## 2024-10-07 PROCEDURE — 1036F TOBACCO NON-USER: CPT | Performed by: INTERNAL MEDICINE

## 2024-10-07 PROCEDURE — 3075F SYST BP GE 130 - 139MM HG: CPT | Performed by: INTERNAL MEDICINE

## 2024-10-07 PROCEDURE — 3078F DIAST BP <80 MM HG: CPT | Performed by: INTERNAL MEDICINE

## 2024-10-07 PROCEDURE — 3008F BODY MASS INDEX DOCD: CPT | Performed by: INTERNAL MEDICINE

## 2024-10-07 PROCEDURE — 1160F RVW MEDS BY RX/DR IN RCRD: CPT | Performed by: INTERNAL MEDICINE

## 2024-10-07 PROCEDURE — 99213 OFFICE O/P EST LOW 20 MIN: CPT | Performed by: INTERNAL MEDICINE

## 2024-10-07 PROCEDURE — 1159F MED LIST DOCD IN RCRD: CPT | Performed by: INTERNAL MEDICINE

## 2024-10-07 RX ORDER — VALSARTAN 40 MG/1
20 TABLET ORAL 2 TIMES DAILY
COMMUNITY
Start: 2024-10-02

## 2024-10-07 RX ORDER — METOPROLOL SUCCINATE 25 MG/1
25 TABLET, EXTENDED RELEASE ORAL 2 TIMES DAILY
COMMUNITY
Start: 2024-07-01 | End: 2025-07-01

## 2024-10-07 ASSESSMENT — ENCOUNTER SYMPTOMS
VOMITING: 0
HEADACHES: 0
SHORTNESS OF BREATH: 0
PALPITATIONS: 0
COUGH: 0
FEVER: 0
CHILLS: 0
NAUSEA: 0
FATIGUE: 0
DIARRHEA: 0

## 2024-10-07 NOTE — PROGRESS NOTES
Endocrinology: Follow up visit  Subjective   Patient ID: Mj Gandhi is a 74 y.o. female who presents for Hypothyroidism, Thyroid Cancer, and Osteoporosis.    PCP: No primary care provider on file.    HPI  Here for yearly follow up hypothyroidism:  88 mcg t4 x6  5 mcg t3 x7  Since last visit unfortunately had to have repeat aortic valve surgery but did very well.  Now retired and still exercising regularly. Taking t4/t3 as directed      Review of Systems   Constitutional:  Negative for chills, fatigue and fever.   Respiratory:  Negative for cough and shortness of breath.    Cardiovascular:  Negative for chest pain and palpitations.   Gastrointestinal:  Negative for diarrhea, nausea and vomiting.   Neurological:  Negative for headaches.       Patient Active Problem List   Diagnosis    Aneurysm of ascending aorta (CMS-HCC)    Anxiety    Aortic valve disorder    Bicuspid aortic valve    Cardiomyopathy    Combined form of age-related cataract, both eyes    Cyst of left breast    Dense breast tissue    Esophageal reflux    Hyperlipidemia    Hyperopia with presbyopia of both eyes    Hypothyroidism    Injury of artery    Myopia, bilateral    Osteopenia    Hypertension    Pseudoaneurysm    S/P TAVR (transcatheter aortic valve replacement)    Vitamin D deficiency    History of thyroid cancer    Menopause    Nonrheumatic aortic valve insufficiency        Home Meds:  Current Outpatient Medications   Medication Instructions    ALPRAZolam (XANAX) 0.25 mg, oral, Nightly PRN    aspirin 81 mg, oral, Daily    atorvastatin (LIPITOR) 40 mg, oral, Daily, Taking 0.5 tablet qd    biotin 5 mg tablet 1 tablet, oral, Daily    calcium carb,gluc/mag ox,gluc (CALCIUM MAGNESIUM ORAL) 1 tablet, oral, Daily    cholecalciferol (VITAMIN D-3) 50 mcg, oral, Daily    diphenhydrAMINE (BENADRYL) 25 mg, oral, Nightly    GLUTATHIONE ORAL 1 tablet, oral, Daily    liothyronine (Cytomel) 5 mcg tablet 1 tablet, oral, Daily    metoprolol succinate XL  "(TOPROL-XL) 25 mg, oral, 2 times daily    multivitamin with iron (HAIR VITAMINS ORAL)  HAIR VITAMIN/ZINC/COPPER ONE TABLET DAILY<BR>    NON FORMULARY  K2MK7 daily SUPPLIMENT used to DIRECT CALCIUM INTO BONE<BR>    omega-3 acid ethyl esters (LOVAZA) 1 g, oral, 2 times daily    Synthroid 88 mcg, oral, Daily    ubidecarenone (COENZYME Q10, BULK, MISC) oral    valsartan (DIOVAN) 20 mg, oral, 2 times daily        Allergies   Allergen Reactions    Bee Pollen Unknown    Dog Dander Unknown    House Dust Unknown        Objective   Vitals:    10/07/24 1254   BP: 130/72   Pulse: 72   Resp: 16      Vitals:    10/07/24 1254   Weight: 55.2 kg (121 lb 9.6 oz)      Body mass index is 19.63 kg/m².   Physical Exam  Constitutional:       Appearance: Normal appearance. She is normal weight.   HENT:      Head: Normocephalic and atraumatic.   Neck:      Thyroid: No thyroid mass, thyromegaly or thyroid tenderness.   Cardiovascular:      Rate and Rhythm: Normal rate and regular rhythm.      Heart sounds: No murmur heard.     No gallop.   Pulmonary:      Effort: Pulmonary effort is normal.      Breath sounds: Normal breath sounds.   Abdominal:      Palpations: Abdomen is soft.      Comments: benign   Neurological:      General: No focal deficit present.      Mental Status: She is alert and oriented to person, place, and time.      Deep Tendon Reflexes: Reflexes are normal and symmetric.   Psychiatric:         Behavior: Behavior is cooperative.         Labs:  Lab Results   Component Value Date    TSH 0.75 12/01/2023    FREET4 1.06 12/01/2023      No results found for: \"PR1\", \"THYROIDPAB\", \"TSI\"     Assessment/Plan   Assessment & Plan  Hypothyroidism, unspecified type    Blood work reviewed  Levels look great   Follow up in one year      Electronically signed by:  Earnestine Luna MD 10/07/24 1:28 PM              "

## 2024-10-22 ENCOUNTER — APPOINTMENT (OUTPATIENT)
Dept: OBSTETRICS AND GYNECOLOGY | Facility: CLINIC | Age: 74
End: 2024-10-22
Payer: MEDICARE

## 2024-10-22 VITALS
HEIGHT: 66 IN | DIASTOLIC BLOOD PRESSURE: 108 MMHG | SYSTOLIC BLOOD PRESSURE: 160 MMHG | WEIGHT: 123 LBS | BODY MASS INDEX: 19.77 KG/M2

## 2024-10-22 DIAGNOSIS — Z12.31 ENCOUNTER FOR SCREENING MAMMOGRAM FOR MALIGNANT NEOPLASM OF BREAST: ICD-10-CM

## 2024-10-22 DIAGNOSIS — Z01.419 ENCOUNTER FOR GYNECOLOGICAL EXAMINATION WITHOUT ABNORMAL FINDING: ICD-10-CM

## 2024-10-22 DIAGNOSIS — M81.0 AGE RELATED OSTEOPOROSIS, UNSPECIFIED PATHOLOGICAL FRACTURE PRESENCE: Primary | ICD-10-CM

## 2024-10-22 PROCEDURE — 3080F DIAST BP >= 90 MM HG: CPT | Performed by: OBSTETRICS & GYNECOLOGY

## 2024-10-22 PROCEDURE — 1159F MED LIST DOCD IN RCRD: CPT | Performed by: OBSTETRICS & GYNECOLOGY

## 2024-10-22 PROCEDURE — 3008F BODY MASS INDEX DOCD: CPT | Performed by: OBSTETRICS & GYNECOLOGY

## 2024-10-22 PROCEDURE — G2211 COMPLEX E/M VISIT ADD ON: HCPCS | Performed by: OBSTETRICS & GYNECOLOGY

## 2024-10-22 PROCEDURE — 3077F SYST BP >= 140 MM HG: CPT | Performed by: OBSTETRICS & GYNECOLOGY

## 2024-10-22 PROCEDURE — 99214 OFFICE O/P EST MOD 30 MIN: CPT | Performed by: OBSTETRICS & GYNECOLOGY

## 2024-10-22 NOTE — PROGRESS NOTES
Subjective   Mj Gandhi is a 74 y.o. female here for GYN care.  She has no postmenopausal bleeding or discharge.  No dysuria or change in bowel habits.    She is  but not sexually active with her  due to health concerns.    The patient did have open heart surgery for a valvular repair with the [facility] in 2024.    Her bone density in 2022 showed osteoporosis, T-score -2.8.  She has a history of Actonel use for 5 years and is currently not on bone sparing medications.  Personal health questionnaire reviewed: yes.     Gynecologic History  No LMP recorded. Patient is postmenopausal.  Contraception: post menopausal status  Last Pap: 10/18/19. Results were: normal  Last mammogram: 22. Results were: normal    Obstetric History  OB History    Para Term  AB Living   1 1           SAB IAB Ectopic Multiple Live Births                  # Outcome Date GA Lbr Eusebio/2nd Weight Sex Type Anes PTL Lv   1 Para                Objective   Constitutional: Alert and in no acute distress. Well developed, well nourished.   Head and Face: Head and face: Normal.    Eyes: Normal external exam - nonicteric sclera, extraocular movements intact (EOMI) and no ptosis.   Neck: No neck asymmetry. Supple. Thyroid not enlarged and there were no palpable thyroid nodules.    Pulmonary: No respiratory distress.   Chest: Breasts: Normal appearance, no nipple discharge and no skin changes.  There is a large midline chest scar that is well-healed.  Palpation of breasts and axillae: No palpable mass and no axillary lymphadenopathy.   Abdomen: Soft nontender; no abdominal mass palpated. No organomegaly. No hernias.   Genitourinary: External genitalia: Normal. No inguinal lymphadenopathy. Bartholin's Urethral and Skenes Glands: Normal. Urethra: Normal.  Bladder: Normal on palpation. Vagina: Normal. Cervix: Normal.  Uterus: Normal.  Right Adnexa/parametria: Normal.  Left Adnexa/parametria: Normal.   Inspection of Perianal Area: Normal.   Musculoskeletal: No joint swelling seen, normal movements of all extremities.   Skin: Normal skin color and pigmentation, normal skin turgor, and no rash.   Neurologic: Non-focal. Grossly intact.   Psychiatric: Alert and oriented x 3. Affect normal to patient baseline. Mood: Appropriate.  Physical Exam     Assessment/Plan   This is a 74-year-old female with a normal exam.  No Pap smear was sent.    Her routine mammogram was ordered with tomosynthesis.    A bone density test was ordered to monitor the osteoporosis.  She is not on Actonel, but we could discuss options if there is worsening osteoporosis.    I will see her in 2 years, or sooner as needed.    Mammogram ordered.

## 2024-11-08 ENCOUNTER — APPOINTMENT (OUTPATIENT)
Dept: RADIOLOGY | Facility: CLINIC | Age: 74
End: 2024-11-08
Payer: COMMERCIAL

## 2024-11-18 DIAGNOSIS — E03.9 HYPOTHYROIDISM, UNSPECIFIED TYPE: Primary | ICD-10-CM

## 2024-11-18 RX ORDER — LIOTHYRONINE SODIUM 5 UG/1
5 TABLET ORAL DAILY
Qty: 90 TABLET | Refills: 3 | Status: SHIPPED | OUTPATIENT
Start: 2024-11-18

## 2024-11-20 ENCOUNTER — HOSPITAL ENCOUNTER (OUTPATIENT)
Dept: RADIOLOGY | Facility: CLINIC | Age: 74
Discharge: HOME | End: 2024-11-20
Payer: COMMERCIAL

## 2024-11-20 VITALS — WEIGHT: 123.02 LBS | HEIGHT: 66 IN | BODY MASS INDEX: 19.77 KG/M2

## 2024-11-20 DIAGNOSIS — Z01.419 ENCOUNTER FOR GYNECOLOGICAL EXAMINATION WITHOUT ABNORMAL FINDING: ICD-10-CM

## 2024-11-20 DIAGNOSIS — Z12.31 ENCOUNTER FOR SCREENING MAMMOGRAM FOR MALIGNANT NEOPLASM OF BREAST: ICD-10-CM

## 2024-11-20 PROCEDURE — 77067 SCR MAMMO BI INCL CAD: CPT

## 2024-12-13 ENCOUNTER — APPOINTMENT (OUTPATIENT)
Dept: RADIOLOGY | Facility: CLINIC | Age: 74
End: 2024-12-13
Payer: COMMERCIAL

## 2024-12-15 DIAGNOSIS — E03.9 HYPOTHYROIDISM, UNSPECIFIED TYPE: ICD-10-CM

## 2024-12-15 RX ORDER — LEVOTHYROXINE SODIUM 88 UG/1
88 TABLET ORAL DAILY
Qty: 90 TABLET | Refills: 3 | Status: SHIPPED | OUTPATIENT
Start: 2024-12-15

## 2025-01-30 DIAGNOSIS — E03.9 HYPOTHYROIDISM, UNSPECIFIED TYPE: ICD-10-CM

## 2025-01-30 RX ORDER — LEVOTHYROXINE SODIUM 88 UG/1
88 TABLET ORAL DAILY
Qty: 90 TABLET | Refills: 3 | Status: SHIPPED | OUTPATIENT
Start: 2025-01-30 | End: 2026-01-30

## 2025-02-14 DIAGNOSIS — E03.9 HYPOTHYROIDISM, UNSPECIFIED TYPE: ICD-10-CM

## 2025-02-14 RX ORDER — LIOTHYRONINE SODIUM 5 UG/1
5 TABLET ORAL DAILY
Qty: 90 TABLET | Refills: 3 | Status: SHIPPED | OUTPATIENT
Start: 2025-02-14

## 2025-03-24 ENCOUNTER — APPOINTMENT (OUTPATIENT)
Dept: RADIOLOGY | Facility: CLINIC | Age: 75
End: 2025-03-24
Payer: COMMERCIAL

## 2025-04-22 ENCOUNTER — APPOINTMENT (OUTPATIENT)
Dept: RADIOLOGY | Facility: CLINIC | Age: 75
End: 2025-04-22
Payer: MEDICARE

## 2025-05-02 ENCOUNTER — TELEPHONE (OUTPATIENT)
Dept: PRIMARY CARE | Facility: CLINIC | Age: 75
End: 2025-05-02
Payer: MEDICARE

## 2025-05-02 DIAGNOSIS — E03.9 HYPOTHYROIDISM, UNSPECIFIED TYPE: ICD-10-CM

## 2025-05-02 RX ORDER — LEVOTHYROXINE SODIUM 88 UG/1
88 TABLET ORAL DAILY
Qty: 14 TABLET | Refills: 0 | Status: SHIPPED | OUTPATIENT
Start: 2025-05-02 | End: 2025-05-16

## 2025-05-02 RX ORDER — LIOTHYRONINE SODIUM 5 UG/1
5 TABLET ORAL DAILY
Qty: 14 TABLET | Refills: 0 | Status: SHIPPED | OUTPATIENT
Start: 2025-05-02 | End: 2025-05-16

## 2025-05-05 ENCOUNTER — TELEPHONE (OUTPATIENT)
Facility: CLINIC | Age: 75
End: 2025-05-05
Payer: MEDICARE

## 2025-05-05 DIAGNOSIS — U07.1 COVID: Primary | ICD-10-CM

## 2025-05-05 RX ORDER — NIRMATRELVIR AND RITONAVIR 300-100 MG
3 KIT ORAL 2 TIMES DAILY
Qty: 30 TABLET | Refills: 0 | Status: SHIPPED | OUTPATIENT
Start: 2025-05-05 | End: 2025-05-10

## 2025-05-05 NOTE — TELEPHONE ENCOUNTER
Pt called in for states that she tested positive for COVID and is requesting a prescription for paxlovid as you have given her  one last week. Pt is home now and would like to have rx sent to [facility] in [place]

## 2025-06-03 DIAGNOSIS — E03.9 HYPOTHYROIDISM, UNSPECIFIED TYPE: ICD-10-CM

## 2025-06-03 RX ORDER — LEVOTHYROXINE SODIUM 88 UG/1
88 TABLET ORAL DAILY
Qty: 90 TABLET | Refills: 3 | Status: SHIPPED | OUTPATIENT
Start: 2025-06-03 | End: 2026-06-03

## 2025-06-03 RX ORDER — LIOTHYRONINE SODIUM 5 UG/1
5 TABLET ORAL DAILY
Qty: 90 TABLET | Refills: 3 | Status: SHIPPED | OUTPATIENT
Start: 2025-06-03 | End: 2026-06-03

## 2025-08-18 ENCOUNTER — APPOINTMENT (OUTPATIENT)
Dept: RADIOLOGY | Facility: CLINIC | Age: 75
End: 2025-08-18
Payer: MEDICARE

## 2025-08-18 ENCOUNTER — TELEPHONE (OUTPATIENT)
Dept: OBSTETRICS AND GYNECOLOGY | Facility: CLINIC | Age: 75
End: 2025-08-18

## 2025-08-25 ENCOUNTER — APPOINTMENT (OUTPATIENT)
Dept: RADIOLOGY | Facility: CLINIC | Age: 75
End: 2025-08-25
Payer: MEDICARE

## 2025-08-25 DIAGNOSIS — M81.0 AGE RELATED OSTEOPOROSIS, UNSPECIFIED PATHOLOGICAL FRACTURE PRESENCE: ICD-10-CM

## 2025-08-25 PROCEDURE — 77080 DXA BONE DENSITY AXIAL: CPT | Performed by: RADIOLOGY

## 2025-08-25 PROCEDURE — 77080 DXA BONE DENSITY AXIAL: CPT

## 2025-08-26 DIAGNOSIS — M81.0 AGE RELATED OSTEOPOROSIS, UNSPECIFIED PATHOLOGICAL FRACTURE PRESENCE: Primary | ICD-10-CM

## 2025-08-27 ENCOUNTER — TELEPHONE (OUTPATIENT)
Facility: CLINIC | Age: 75
End: 2025-08-27
Payer: MEDICARE

## 2025-10-07 ENCOUNTER — APPOINTMENT (OUTPATIENT)
Dept: ENDOCRINOLOGY | Facility: CLINIC | Age: 75
End: 2025-10-07
Payer: MEDICARE

## 2025-11-21 ENCOUNTER — APPOINTMENT (OUTPATIENT)
Dept: RADIOLOGY | Facility: CLINIC | Age: 75
End: 2025-11-21
Payer: MEDICARE

## 2026-01-16 ENCOUNTER — APPOINTMENT (OUTPATIENT)
Facility: CLINIC | Age: 76
End: 2026-01-16
Payer: MEDICARE